# Patient Record
Sex: FEMALE | URBAN - METROPOLITAN AREA
[De-identification: names, ages, dates, MRNs, and addresses within clinical notes are randomized per-mention and may not be internally consistent; named-entity substitution may affect disease eponyms.]

---

## 2022-08-02 ENCOUNTER — OFFICE VISIT (OUTPATIENT)
Dept: URBAN - METROPOLITAN AREA CLINIC 109 | Facility: CLINIC | Age: 47
End: 2022-08-02

## 2022-08-02 ENCOUNTER — LAB OUTSIDE AN ENCOUNTER (OUTPATIENT)
Dept: URBAN - METROPOLITAN AREA CLINIC 109 | Facility: CLINIC | Age: 47
End: 2022-08-02

## 2022-08-02 ENCOUNTER — OFFICE VISIT (OUTPATIENT)
Dept: URBAN - METROPOLITAN AREA CLINIC 109 | Facility: CLINIC | Age: 47
End: 2022-08-02
Payer: COMMERCIAL

## 2022-08-02 VITALS
HEART RATE: 91 BPM | SYSTOLIC BLOOD PRESSURE: 117 MMHG | DIASTOLIC BLOOD PRESSURE: 76 MMHG | WEIGHT: 198.6 LBS | HEIGHT: 67 IN | TEMPERATURE: 97.2 F | BODY MASS INDEX: 31.17 KG/M2

## 2022-08-02 DIAGNOSIS — Z12.11 SCREENING FOR COLON CANCER: ICD-10-CM

## 2022-08-02 DIAGNOSIS — K80.20 CALCULUS OF GALLBLADDER WITHOUT CHOLECYSTITIS WITHOUT OBSTRUCTION: ICD-10-CM

## 2022-08-02 DIAGNOSIS — K21.9 GASTROESOPHAGEAL REFLUX DISEASE, UNSPECIFIED WHETHER ESOPHAGITIS PRESENT: ICD-10-CM

## 2022-08-02 DIAGNOSIS — R10.13 EPIGASTRIC PAIN: ICD-10-CM

## 2022-08-02 DIAGNOSIS — Z83.71 FAMILY HISTORY OF COLONIC POLYPS: ICD-10-CM

## 2022-08-02 DIAGNOSIS — R10.11 RUQ ABDOMINAL PAIN: ICD-10-CM

## 2022-08-02 PROBLEM — 70342003: Status: ACTIVE | Noted: 2022-08-02

## 2022-08-02 PROBLEM — 235595009: Status: ACTIVE | Noted: 2022-08-02

## 2022-08-02 PROCEDURE — 99244 OFF/OP CNSLTJ NEW/EST MOD 40: CPT | Performed by: INTERNAL MEDICINE

## 2022-08-02 PROCEDURE — 99204 OFFICE O/P NEW MOD 45 MIN: CPT | Performed by: INTERNAL MEDICINE

## 2022-08-02 RX ORDER — PANTOPRAZOLE SODIUM 40 MG/1
1 TABLET TABLET, DELAYED RELEASE ORAL ONCE A DAY
Qty: 30 | Refills: 3 | OUTPATIENT
Start: 2022-08-02

## 2022-08-02 RX ORDER — HYDROXYZINE HYDROCHLORIDE 25 MG/1
1 TABLET AT BEDTIME AS NEEDED TABLET, FILM COATED ORAL ONCE A DAY
Status: ACTIVE | COMMUNITY

## 2022-08-02 NOTE — HPI-TODAY'S VISIT:
Ms. Wood is a 47 y/o Australian speaking F who was referred to us by Dr. Nehal Rivas for a screening colonoscopy and RUQ pain. A copy of this OV will be sent to referring provider. Pt states that she has been dealing with RUQ pain x2 years. Per referral pt has a hx of gallstones, but no CCY. Reports increase in pain w/ grains and fatty/greasy foods though she does not eat much of these types of foods. States that her PCP ordered an US of her gallbladder to further assess and that it is scheduled for today.   Endorses epigastric pain postprandially as well as burning/reflux. States that symptoms are present daily. Denies taking any medication for symptoms.  Denies previous colonoscopy or FMHx of CRC. States that sister had colon polyps. Denies changes in bowel habits, bloody stool, abd pain not related to gallbladder, or wt loss. Denies heart/lung issues, use of blood thinners or home O2.

## 2022-08-16 ENCOUNTER — OFFICE VISIT (OUTPATIENT)
Dept: URBAN - METROPOLITAN AREA SURGERY CENTER 23 | Facility: SURGERY CENTER | Age: 47
End: 2022-08-16
Payer: COMMERCIAL

## 2022-08-16 DIAGNOSIS — Z12.11 COLON CANCER SCREENING: ICD-10-CM

## 2022-08-16 PROCEDURE — 45378 DIAGNOSTIC COLONOSCOPY: CPT | Performed by: INTERNAL MEDICINE

## 2022-08-16 PROCEDURE — G8907 PT DOC NO EVENTS ON DISCHARG: HCPCS | Performed by: INTERNAL MEDICINE

## 2022-08-16 RX ORDER — PANTOPRAZOLE SODIUM 40 MG/1
1 TABLET TABLET, DELAYED RELEASE ORAL ONCE A DAY
Qty: 30 | Refills: 3 | Status: ACTIVE | COMMUNITY
Start: 2022-08-02

## 2022-08-16 RX ORDER — HYDROXYZINE HYDROCHLORIDE 25 MG/1
1 TABLET AT BEDTIME AS NEEDED TABLET, FILM COATED ORAL ONCE A DAY
Status: ACTIVE | COMMUNITY

## 2022-10-19 ENCOUNTER — OFFICE VISIT (OUTPATIENT)
Dept: URBAN - METROPOLITAN AREA CLINIC 109 | Facility: CLINIC | Age: 47
End: 2022-10-19
Payer: COMMERCIAL

## 2022-10-19 ENCOUNTER — DASHBOARD ENCOUNTERS (OUTPATIENT)
Age: 47
End: 2022-10-19

## 2022-10-19 VITALS
WEIGHT: 200 LBS | BODY MASS INDEX: 31.39 KG/M2 | HEIGHT: 67 IN | TEMPERATURE: 97.5 F | DIASTOLIC BLOOD PRESSURE: 80 MMHG | SYSTOLIC BLOOD PRESSURE: 127 MMHG | HEART RATE: 84 BPM

## 2022-10-19 DIAGNOSIS — K80.20 GALLSTONES: ICD-10-CM

## 2022-10-19 DIAGNOSIS — R10.11 RUQ ABDOMINAL PAIN: ICD-10-CM

## 2022-10-19 DIAGNOSIS — K64.8 INTERNAL HEMORRHOID: ICD-10-CM

## 2022-10-19 DIAGNOSIS — K57.30 ACQUIRED DIVERTICULOSIS OF COLON: ICD-10-CM

## 2022-10-19 PROBLEM — 397881000: Status: ACTIVE | Noted: 2022-10-19

## 2022-10-19 PROBLEM — 235919008: Status: ACTIVE | Noted: 2022-10-19

## 2022-10-19 PROCEDURE — 99214 OFFICE O/P EST MOD 30 MIN: CPT | Performed by: INTERNAL MEDICINE

## 2022-10-19 RX ORDER — HYDROXYZINE HYDROCHLORIDE 25 MG/1
1 TABLET AT BEDTIME AS NEEDED TABLET, FILM COATED ORAL ONCE A DAY
COMMUNITY

## 2022-10-19 RX ORDER — PANTOPRAZOLE SODIUM 40 MG/1
1 TABLET TABLET, DELAYED RELEASE ORAL ONCE A DAY
Qty: 30 | Refills: 3 | COMMUNITY
Start: 2022-08-02

## 2022-10-19 NOTE — HPI-TODAY'S VISIT:
Ms. Wood is a 45 y/o SS F who is here for f/u. At last OV, labs, MRCP and colonoscopy were ordered. Colonoscopy revealed diverticulosis in the L colon and internal hemorrhoids. No specimens collected, recall x10 yrs. Other imaging and labs not performed. Pt was also started on PPI for postprandial epigastric pain. Today she states that her only complaint is RUQ pain. Reports increased pain with eating and postural changes. States that there is nothing she can do to make the pain go away, rather it eventually just stops. Endorses itching x4 months associated with red lesions which will "go away and leave marks." Pt has an appointment with derm the end of this month. Pt has US with PCP which revealed gallstones.   OV 8/2/22: Ms. Wood is a 45 y/o [place]ian speaking F who was referred to us by Dr. Nehal Rivas for a screening colonoscopy and RUQ pain. A copy of this OV will be sent to referring provider. Pt states that she has been dealing with RUQ pain x2 years. Per referral pt has a hx of gallstones, but no CCY. Reports increase in pain w/ grains and fatty/greasy foods though she does not eat much of these types of foods. States that her PCP ordered an US of her gallbladder to further assess and that it is scheduled for today.   Endorses epigastric pain postprandially as well as burning/reflux. States that symptoms are present daily. Denies taking any medication for symptoms.  Denies previous colonoscopy or FMHx of CRC. States that sister had colon polyps. Denies changes in bowel habits, bloody stool, abd pain not related to gallbladder, or wt loss. Denies heart/lung issues, use of blood thinners or home O2.

## 2022-10-20 LAB
A/G RATIO: 1.7
ALBUMIN: 4.7
ALKALINE PHOSPHATASE: 94
ALT (SGPT): 34
AST (SGOT): 23
BILIRUBIN, TOTAL: 0.6
BUN/CREATININE RATIO: (no result)
BUN: 13
CALCIUM: 9.7
CARBON DIOXIDE, TOTAL: 28
CHLORIDE: 103
CREATININE: 0.53
EGFR: 115
GLOBULIN, TOTAL: 2.7
GLUCOSE: 88
POTASSIUM: 4.2
PROTEIN, TOTAL: 7.4
SODIUM: 140